# Patient Record
Sex: MALE | Race: WHITE | Employment: PART TIME | URBAN - METROPOLITAN AREA
[De-identification: names, ages, dates, MRNs, and addresses within clinical notes are randomized per-mention and may not be internally consistent; named-entity substitution may affect disease eponyms.]

---

## 2019-11-06 ENCOUNTER — APPOINTMENT (OUTPATIENT)
Dept: GENERAL RADIOLOGY | Facility: CLINIC | Age: 29
End: 2019-11-06
Attending: PHYSICIAN ASSISTANT
Payer: OTHER MISCELLANEOUS

## 2019-11-06 ENCOUNTER — HOSPITAL ENCOUNTER (EMERGENCY)
Facility: CLINIC | Age: 29
Discharge: HOME | End: 2019-11-06
Attending: PHYSICIAN ASSISTANT | Admitting: PHYSICIAN ASSISTANT
Payer: OTHER MISCELLANEOUS

## 2019-11-06 VITALS
TEMPERATURE: 98.2 F | HEIGHT: 70 IN | WEIGHT: 230 LBS | BODY MASS INDEX: 32.93 KG/M2 | DIASTOLIC BLOOD PRESSURE: 106 MMHG | OXYGEN SATURATION: 100 % | RESPIRATION RATE: 18 BRPM | SYSTOLIC BLOOD PRESSURE: 158 MMHG

## 2019-11-06 DIAGNOSIS — S60.459A FOREIGN BODY OF FINGER: ICD-10-CM

## 2019-11-06 PROCEDURE — 90471 IMMUNIZATION ADMIN: CPT

## 2019-11-06 PROCEDURE — 99284 EMERGENCY DEPT VISIT MOD MDM: CPT | Mod: 25

## 2019-11-06 PROCEDURE — 10120 INC&RMVL FB SUBQ TISS SMPL: CPT

## 2019-11-06 PROCEDURE — 25000128 H RX IP 250 OP 636: Performed by: PHYSICIAN ASSISTANT

## 2019-11-06 PROCEDURE — 90715 TDAP VACCINE 7 YRS/> IM: CPT | Performed by: PHYSICIAN ASSISTANT

## 2019-11-06 PROCEDURE — 73140 X-RAY EXAM OF FINGER(S): CPT | Mod: RT

## 2019-11-06 PROCEDURE — 96365 THER/PROPH/DIAG IV INF INIT: CPT

## 2019-11-06 RX ORDER — CEFAZOLIN SODIUM 2 G/100ML
2 INJECTION, SOLUTION INTRAVENOUS ONCE
Status: COMPLETED | OUTPATIENT
Start: 2019-11-06 | End: 2019-11-06

## 2019-11-06 RX ORDER — HYDROCODONE BITARTRATE AND ACETAMINOPHEN 5; 325 MG/1; MG/1
1 TABLET ORAL EVERY 6 HOURS PRN
Qty: 4 TABLET | Refills: 0 | Status: SHIPPED | OUTPATIENT
Start: 2019-11-06 | End: 2019-11-09

## 2019-11-06 RX ORDER — CEPHALEXIN 500 MG/1
500 CAPSULE ORAL 4 TIMES DAILY
Qty: 20 CAPSULE | Refills: 0 | Status: SHIPPED | OUTPATIENT
Start: 2019-11-06 | End: 2019-11-11

## 2019-11-06 RX ADMIN — CEFAZOLIN SODIUM 2 G: 2 INJECTION, SOLUTION INTRAVENOUS at 16:06

## 2019-11-06 RX ADMIN — CLOSTRIDIUM TETANI TOXOID ANTIGEN (FORMALDEHYDE INACTIVATED), CORYNEBACTERIUM DIPHTHERIAE TOXOID ANTIGEN (FORMALDEHYDE INACTIVATED), BORDETELLA PERTUSSIS TOXOID ANTIGEN (GLUTARALDEHYDE INACTIVATED), BORDETELLA PERTUSSIS FILAMENTOUS HEMAGGLUTININ ANTIGEN (FORMALDEHYDE INACTIVATED), BORDETELLA PERTUSSIS PERTACTIN ANTIGEN, AND BORDETELLA PERTUSSIS FIMBRIAE 2/3 ANTIGEN 0.5 ML: 5; 2; 2.5; 5; 3; 5 INJECTION, SUSPENSION INTRAMUSCULAR at 16:06

## 2019-11-06 ASSESSMENT — ENCOUNTER SYMPTOMS: WOUND: 1

## 2019-11-06 ASSESSMENT — MIFFLIN-ST. JEOR: SCORE: 2019.52

## 2019-11-06 NOTE — ED TRIAGE NOTES
Pt was holding a piece of sheet metal at work and it slipped out of his hand and went through his pointer finger of his rt hand. Pt unsure of last tetanus.

## 2019-11-06 NOTE — ED PROVIDER NOTES
"  History     Chief Complaint:  Laceration    HPI   Jevon Stokes is a right hand dominant 29 year old male who presents with a foreign body in his right index finger. The patient was cleaning up sheet metal after fixing a furnace at work about 30 minutes ago. A piece slipped when he was picking it up and a shard pierced through his finger. His last Tdap was in 2010.  He states he has difficulty moving the finger secondary to the foreign body.  He also notes some mild numbness to the distal aspect of the affected finger.  He has no further concerns at this time.    Allergies:  No Known Allergies     Medications:    clomiphene citrate  anastrozole    Past Medical History:    The patient denies any significant past medical history.     Past Surgical History:    The patient does not have any pertinent past surgical history.     Family History:    No past pertinent family history.    Social History:  Tobacco use: occasionally   Drug use: THC  Marital Status:  Single [1]    Review of Systems   Skin: Positive for wound.        Foreign body in right index finger   All other systems reviewed and are negative.    Physical Exam     Patient Vitals for the past 24 hrs:   BP Temp Temp src Heart Rate Resp SpO2 Height Weight   11/06/19 1521 (!) 158/106 98.2  F (36.8  C) Temporal 65 18 100 % 1.778 m (5' 10\") 104.3 kg (230 lb)        Physical Exam  General: Resting comfortably.  Alert and oriented.   Head:  The scalp, face, and head appear normal   Eyes:  Conjunctivae and sclerae are normal    CV:  Radial pulse intact to the right wrist.  Capillary refill is brisk in all digits of the right hand.   Resp:  No tachypnea.  No respiratory distress.  MS:  The patient can hold fingers and resisted abduction, but has trouble making the okay sign and hold against resistance second the foreign body in the right upper extremity.  The patient has good strength with wrist extension to the right upper extremity. Upon removal of the foreign " body, the patient is able to flex and extend against resistance at the MCP, DIP, and PIP joints of the right index finger.  Skin:  There is a triangular-shaped sheet-metal foreign body to the palmar aspect of the left index finger overlying the area of the middle phalanx.  There is mild surrounding tenderness.   Neuro:  There is mild decreased sensation to the distal aspect of the right index finger.  Otherwise sensation is intact throughout right hand.      Emergency Department Course     Imaging:  Radiology findings were communicated with the patient who voiced understanding of the findings.    XR right finger, G/E 2 views:  There is a triangular-shaped piece of sheet metal  overlying the radial aspect of the index finger. It is in close  proximity to the distal aspect of the middle phalanx of the index  finger. No evidence of fracture., as per radiology.      Procedures:  .PROCEDURE:  Digital Block    LOCATION: right index finger    ANESTHESIA: Regional block using bupivacaine, total of 4 mLs    PROCEDURE NOTE: The patient tolerated the procedure well with good relief of  discomfort and there were no complications.        Foreign Body Removal     LOCATION:   right index finger    FUNCTION:  Sensation decreased to distal aspect of the finger. Circulation, motor, and tendon function are intact.    ANESTHESIA:  Digital block using Bupivacaine,  total of 4 mLs, as above.    PREPARATION:  Irrigation and Scrubbing with Normal Saline and Betadine.     PROCEDURE:  Splinter remover was used to remove foreign body, taking care to avoid underlying structures. The wound was then irrigated until clear with normal saline. Discussed wound care and monitoring for infection/complications.     Interventions:  1606 Tdap 0.5 mL IM   Ancef 2 g in 100 mL dextrose iv     Emergency Department Course:  Past medical records, nursing notes, and vitals reviewed.    1536: I performed an exam of the patient as documented above. Digit block  was performed at this time, as above.     The patient was sent for a right finger while in the emergency department, results above.     1616: Patient rechecked and updated.      83650: Foreign body removed, as above.    I personally reviewed the imaging results with the Patient and answered all related questions prior to discharge. I prescribed keflex.     Impression & Plan     Medical Decision Making:  Jevon Stokes is a right hand dominant 29 year old male who presents with a laceration and foreign body in his right index finger.  Please refer to HPI for further details.  He has sheet-metal embedded in his right index finger.  X-ray confirms placement, and there are no signs of fracture.  Digital block was performed, and the foreign body was grasped with a splinter remover.  Foreign body was successfully removed. There was no foreign body remaining in the wound on my examination. Patient does have full range of motion of the finger, but cannot rule out tendon involvement.  He does have decreased sensation to the distal finger, concerning for possible nerve injury.  Patient does not desire a post removal x-ray.  Patient was given Ancef given possible concern for tendon involvement.  Patient will be placed on Keflex for 5 days for infection prophylaxis. Appropriate dressing splint was placed. He will follow-up with hand surgery in 1 to 2 days for recheck. He is to return immediately for any uncontrolled pain, weakness, numbness, tingling, worsening pain/ swelling, or any other concerns. All questions were answered prior to discharge.  Patient understands and agrees to this plan.      Discharge Diagnosis:    ICD-10-CM    1. Foreign body of finger S60.459A        Disposition:  Discharged to home    Discharge Medications:  New Prescriptions    CEPHALEXIN (KEFLEX) 500 MG CAPSULE    Take 1 capsule (500 mg) by mouth 4 times daily for 5 days       Scribe Disclosure:  Alexandra EMMANUEL, am serving as a scribe at 3:45 PM on  11/6/2019 to document services personally performed by Tameka Phillip PA based on my observations and the provider's statements to me.       Tameka Phillip PA-C  11/06/19 1938

## 2019-11-06 NOTE — ED AVS SNAPSHOT
[facility] Emergency Department  [address]  [facility] [address]  Phone:  [phone]  Fax:  [phone]                                    Jevon Stokes   MRN: [id number]    Department:  [facility] Emergency Department   Date of Visit:  11/6/2019           After Visit Summary Signature Page    I have received my discharge instructions, and my questions have been answered. I have discussed any challenges I see with this plan with the nurse or doctor.    ..........................................................................................................................................  Patient/Patient Representative Signature      ..........................................................................................................................................  Patient Representative Print Name and Relationship to Patient    ..................................................               ................................................  Date                                   Time    ..........................................................................................................................................  Reviewed by Signature/Title    ...................................................              ..............................................  Date                                               Time          22EPIC Rev 08/18

## (undated) RX ORDER — BUPIVACAINE HYDROCHLORIDE 5 MG/ML
INJECTION, SOLUTION PERINEURAL
Status: DISPENSED
Start: 2019-11-06